# Patient Record
Sex: MALE | Race: WHITE | NOT HISPANIC OR LATINO | URBAN - METROPOLITAN AREA
[De-identification: names, ages, dates, MRNs, and addresses within clinical notes are randomized per-mention and may not be internally consistent; named-entity substitution may affect disease eponyms.]

---

## 2023-10-16 ENCOUNTER — EMERGENCY (EMERGENCY)
Facility: HOSPITAL | Age: 33
LOS: 1 days | Discharge: HOME | End: 2023-10-16
Attending: STUDENT IN AN ORGANIZED HEALTH CARE EDUCATION/TRAINING PROGRAM
Payer: COMMERCIAL

## 2023-10-16 VITALS
HEART RATE: 92 BPM | OXYGEN SATURATION: 99 % | SYSTOLIC BLOOD PRESSURE: 164 MMHG | DIASTOLIC BLOOD PRESSURE: 99 MMHG | HEIGHT: 69 IN | RESPIRATION RATE: 18 BRPM | WEIGHT: 164.91 LBS | TEMPERATURE: 98 F

## 2023-10-16 PROCEDURE — 99284 EMERGENCY DEPT VISIT MOD MDM: CPT

## 2023-10-16 PROCEDURE — 74018 RADEX ABDOMEN 1 VIEW: CPT | Mod: 26

## 2023-10-16 PROCEDURE — 71046 X-RAY EXAM CHEST 2 VIEWS: CPT

## 2023-10-16 PROCEDURE — 99284 EMERGENCY DEPT VISIT MOD MDM: CPT | Mod: 25

## 2023-10-16 PROCEDURE — 74018 RADEX ABDOMEN 1 VIEW: CPT

## 2023-10-16 PROCEDURE — 71046 X-RAY EXAM CHEST 2 VIEWS: CPT | Mod: 26

## 2023-10-16 NOTE — ED PROVIDER NOTE - OBJECTIVE STATEMENT
32yo male presenting due to ingestion of 32yo male presenting due to ingestion of the tab of a drink can 2 hours before presentation to the emergency department. The patient called the poison control center, who told him to come directly to the emergency department. The patient denies any SOB, wheezing, abd pain, throat pain, vomiting, hematemesis, blood in stool.

## 2023-10-16 NOTE — ED ADULT TRIAGE NOTE - CHIEF COMPLAINT QUOTE
Swallowed the metal tab part of the soda can on accident, "I felt it go down", does not feel it in throat currently, no SOB.
No

## 2023-10-16 NOTE — ED ADULT NURSE NOTE - OBJECTIVE STATEMENT
32 y/o male presenting to ED for ingestion of foreign body. pt states "I swallowed a can tab. I opened the can, took a sip and felt it go down." pt was unable to vomit. aox4 breathing even unlabored spontaneously, denies abdominal pain/ throat discomfort at present.

## 2023-10-16 NOTE — ED PROVIDER NOTE - PATIENT PORTAL LINK FT
You can access the FollowMyHealth Patient Portal offered by [facility] by registering at the following website: [email or web address]. By joining [facility]’s FollowMyHealth portal, you will also be able to view your health information using other applications (apps) compatible with our system.

## 2023-10-16 NOTE — ED PROVIDER NOTE - IV ALTEPLASE INCLUSION HIDDEN
How Severe Are Your Spot(S)?: mild What Type Of Note Output Would You Prefer (Optional)?: Standard Output What Is The Reason For Today's Visit?: Annual Full Body Skin Examination with No Concerns What Is The Reason For Today's Visit? (Being Monitored For X): concerning skin lesions on an annual basis Additional History: Patient reports no complaints today. show

## 2023-10-16 NOTE — ED PROVIDER NOTE - CLINICAL SUMMARY MEDICAL DECISION MAKING FREE TEXT BOX
33-year-old male presenting after accidentally swallowing a tab of the drink and approximate 2 hours before arrival.  Patient denies any abdominal pain, shortness of breath, wheezing, nausea, vomiting, diarrhea, hematemesis, blood in stool.  Patient has no wheezing on exam, has no abdominal tenderness.  Concern for location of foreign object, so will order chest x-ray two-view abdominal x-ray to evaluate for location of the tab.

## 2023-10-16 NOTE — ED PROVIDER NOTE - NS ED ROS FT
GENERAL: No fever or chills  HEENT: No trouble swallowing or speaking  CARDIAC: No chest pain  PULMONARY: No cough or SOB  GI: No abdominal pain, no nausea or no vomiting, no diarrhea or constipation  : No changes in urination  MSK: No joint pain  Otherwise as HPI or negative.

## 2023-10-16 NOTE — ED PROVIDER NOTE - PHYSICAL EXAMINATION
Praveen Rojas MD (PGY1)   Physical Exam:    Gen: NAD, AOx3, non-toxic appearing, able to ambulate without assistance  Head: NCAT  HEENT: EOMI, PEERLA, normal conjunctiva, tongue midline, oral mucosa moist  Lung: CTAB, no respiratory distress, no wheezes/rhonchi/rales B/L  CV: RRR, no murmurs, rubs or gallops  Abd: soft, NT, ND, no guarding, no rigidity, no rebound tenderness,   Psych: normal affect, calm

## 2023-10-16 NOTE — ED ADULT NURSE NOTE - CHIEF COMPLAINT QUOTE
Swallowed the metal tab part of the soda can on accident, "I felt it go down", does not feel it in throat currently, no SOB.

## 2023-10-16 NOTE — ED PROVIDER NOTE - ATTENDING CONTRIBUTION TO CARE
I was the supervising attending. I have independently seen face-to-face and examined the patient. I have reviewed the history and physical and discussed the MDM with the resident, fellow, ZULEYKA and/or student. I agree with the assessment and plan as presented unless otherwise documented as follows:    33M denies PMH presenting with concern for foreign body ingestion. Patient states he was drinking a canned drink and that when opening it, he broke off the metal tab on the drink can and it broke, falling into the drink. He states he drank from the can and thinks he accidentally swallowed the can tab. He called Poison Control, who advised ED evaluation. Currently feels well and denies any acute complaints including chest pain, abdominal pain, nausea/vomiting, difficulty breathing. Has not attempted PO since initial event. Appears well, AOx3, not in acute distress, no abdominal tenderness, lungs CTABL, heart sounds WNL. Will obtain XR to evaluate for location of metallic foreign body. Given small size/no sharp edges, likely OK for conservative management/observation if past the pylorus. -Niya Camacho MD (Attending)

## 2023-10-16 NOTE — ED PROVIDER NOTE - PROGRESS NOTE DETAILS
Patient reassessed, continues to appear well.  No signs of respiratory distress, no vomiting, no voice changes.  Chest and abdominal x-rays with no visualization of foreign body.  Patient has not had any bowel movements since ingesting a foreign object.  Given extremely reassuring clinical appearance and physical exam, patient stable for outpatient conservative management.  Advised to check stool for passage of object and to schedule urgent follow-up with PMD for the next 2 to 3 days to reevaluate symptoms and potentially obtain repeat imaging.  Given strict return precautions including chest or abdominal pain, nausea or vomiting, difficulty breathing, inability to tolerate PO, dysphagia or odynophagia, bloody stools, or other concerning symptoms. Patient reassessed, continues to appear well.  No signs of respiratory distress, no vomiting, no voice changes.  Chest and abdominal x-rays with no visualization of foreign body.  Patient has not had any bowel movements since ingesting a foreign object.  Given extremely reassuring clinical appearance and physical exam, patient stable for outpatient conservative management.  Advised to check stool for passage of object and to schedule urgent follow-up with PMD for the next 2 to 3 days to reevaluate symptoms and potentially obtain repeat imaging.  Given strict return precautions including chest or abdominal pain, nausea or vomiting, difficulty breathing, inability to tolerate PO, dysphagia or odynophagia, bloody stools, or other concerning symptoms. -Niya Camacho MD (Attending)

## 2023-10-16 NOTE — ED PROVIDER NOTE - NSFOLLOWUPINSTRUCTIONS_ED_ALL_ED_FT
You were seen in the ER today due to concern for ingestion of a foreign body.  We performed x-rays which did not visualize the foreign body.  Please schedule follow-up with your doctor for the next 2 to 3 days to reassess your symptoms and potentially obtain repeat imaging.  Check your stool for the foreign body to see if it has passed.  Return to the ER if you have chest pain or abdominal pain, vomiting, blood in your vomit or stool, difficulty breathing, discomfort or pain with swallowing, inability to eat or drink, or other concerning symptoms.

## 2023-10-16 NOTE — ED ADULT NURSE NOTE - CCCP TRG CHIEF CMPLNT
Problem: Falls - Risk of 
Goal: *Absence of Falls Document Mihai Dey Fall Risk and appropriate interventions in the flowsheet. Outcome: Progressing Towards Goal 
Fall Risk Interventions: 
Mobility Interventions: Utilize walker, cane, or other assistive device Mentation Interventions: Increase mobility Medication Interventions: Teach patient to arise slowly Elimination Interventions: Call light in reach History of Falls Interventions: Consult care management for discharge planning foreign body throat